# Patient Record
Sex: FEMALE | Race: WHITE | NOT HISPANIC OR LATINO | Employment: FULL TIME | URBAN - NONMETROPOLITAN AREA
[De-identification: names, ages, dates, MRNs, and addresses within clinical notes are randomized per-mention and may not be internally consistent; named-entity substitution may affect disease eponyms.]

---

## 2024-08-01 ENCOUNTER — OFFICE VISIT (OUTPATIENT)
Dept: URGENT CARE | Facility: CLINIC | Age: 44
End: 2024-08-01
Payer: COMMERCIAL

## 2024-08-01 VITALS
RESPIRATION RATE: 20 BRPM | HEART RATE: 94 BPM | OXYGEN SATURATION: 98 % | DIASTOLIC BLOOD PRESSURE: 92 MMHG | TEMPERATURE: 98.5 F | SYSTOLIC BLOOD PRESSURE: 142 MMHG

## 2024-08-01 DIAGNOSIS — J01.11 ACUTE RECURRENT FRONTAL SINUSITIS: Primary | ICD-10-CM

## 2024-08-01 PROCEDURE — 99203 OFFICE O/P NEW LOW 30 MIN: CPT | Performed by: STUDENT IN AN ORGANIZED HEALTH CARE EDUCATION/TRAINING PROGRAM

## 2024-08-01 RX ORDER — OMEPRAZOLE 40 MG/1
CAPSULE, DELAYED RELEASE ORAL
COMMUNITY
Start: 2024-07-29

## 2024-08-01 RX ORDER — FLUTICASONE PROPIONATE 50 MCG
1 SPRAY, SUSPENSION (ML) NASAL DAILY
COMMUNITY

## 2024-08-01 RX ORDER — AMOXICILLIN AND CLAVULANATE POTASSIUM 875; 125 MG/1; MG/1
1 TABLET, FILM COATED ORAL EVERY 12 HOURS SCHEDULED
Qty: 14 TABLET | Refills: 0 | Status: SHIPPED | OUTPATIENT
Start: 2024-08-01 | End: 2024-08-08

## 2024-08-02 NOTE — PROGRESS NOTES
[facility]        NAME: Lucia Lake is a 43 y.o. female  : 1980    MRN: [id number]  DATE: 2024  TIME: 9:21 PM    Assessment and Plan   Acute recurrent frontal sinusitis [J01.11]  1. Acute recurrent frontal sinusitis  amoxicillin-clavulanate (AUGMENTIN) 875-125 mg per tablet            Patient Instructions   There are no Patient Instructions on file for this visit.      Follow up with PCP in 3-5 days.  Proceed to  ER if symptoms worsen.    Chief Complaint     Chief Complaint   Patient presents with    Dizziness    Fatigue     Started yesterday         History of Present Illness       The patient is a 43-year-old female with past medical history significant for chronic nasal congestion and sinus pressure with recurrent episodes of dizziness who presents the clinic for headache and dizziness that occurred over the last 24 hours.  She states that she has mentioned that recurrent episodes of dizziness and congestion to her primary care doctor.  She states that the primary care doctor felt that her symptoms were related to her allergies and suggested continuing Flonase nasal spray.  She states that she has been using the Flonase nasal spray for several months which does not seem to help with her symptoms.  She does feel like she has a history of migraine headaches.  She states that she did take a medication for headache yesterday which did help with her symptoms but she continues to feel like she gets episodic dizziness when she moves her head.  She denies associated vertigo, blurry vision, slurred speech, numbness of her face, numbness of her extremities or generalized weakness.  She has never been referred to ENT.  She states that she also feels like she has chronic postnasal drip and is concerned that she may have sinusitis.  She has not been treated with antibiotics.        Review of Systems   Review of Systems   Constitutional:  Positive for diaphoresis.   HENT:  Positive for  congestion, sinus pressure and sinus pain. Negative for ear pain, facial swelling, hearing loss, mouth sores, nosebleeds, sneezing, sore throat, tinnitus, trouble swallowing and voice change.    Respiratory:  Positive for cough. Negative for apnea, chest tightness, shortness of breath, wheezing and stridor.    Neurological:  Positive for dizziness and headaches.         Current Medications       Current Outpatient Medications:     amoxicillin-clavulanate (AUGMENTIN) 875-125 mg per tablet, Take 1 tablet by mouth every 12 (twelve) hours for 7 days, Disp: 14 tablet, Rfl: 0    fluticasone (FLONASE) 50 mcg/act nasal spray, 1 spray into each nostril daily, Disp: , Rfl:     omeprazole (PriLOSEC) 40 MG capsule, , Disp: , Rfl:     Current Allergies     Allergies as of 2024    (No Known Allergies)            The following portions of the patient's history were reviewed and updated as appropriate: allergies, current medications, past family history, past medical history, past social history, past surgical history and problem list.     Past Medical History:   Diagnosis Date    GERD (gastroesophageal reflux disease)        Past Surgical History:   Procedure Laterality Date     SECTION         No family history on file.      Medications have been verified.        Objective   /92   Pulse 94   Temp 98.5 °F (36.9 °C)   Resp 20   SpO2 98%        Physical Exam     Physical Exam  Constitutional:       Appearance: She is well-developed. She is not diaphoretic.   HENT:      Head: Normocephalic.      Right Ear: Tympanic membrane is injected. Tympanic membrane is not erythematous or retracted.      Left Ear: Tympanic membrane is injected. Tympanic membrane is not retracted.      Nose: Congestion and rhinorrhea present.      Right Turbinates: Enlarged.      Right Sinus: Frontal sinus tenderness present.      Left Sinus: Frontal sinus tenderness present.   Eyes:      General:         Right eye: No discharge.          Left eye: No discharge.      Pupils: Pupils are equal, round, and reactive to light.   Neck:      Thyroid: No thyromegaly.   Cardiovascular:      Rate and Rhythm: Normal rate.      Heart sounds: No murmur heard.  Pulmonary:      Effort: Pulmonary effort is normal. No respiratory distress.      Breath sounds: No wheezing or rales.   Chest:      Chest wall: No tenderness.   Abdominal:      General: There is no distension.      Palpations: Abdomen is soft.      Tenderness: There is no abdominal tenderness. There is no guarding or rebound.   Musculoskeletal:         General: Normal range of motion.      Cervical back: Normal range of motion.   Lymphadenopathy:      Cervical: No cervical adenopathy.   Skin:     General: Skin is warm.   Neurological:      Mental Status: She is alert and oriented to person, place, and time.               -I will treat with Augmentin for suspected sinusitis.  I did suggest that she follow-up with the ENT if her dizziness does not resolve.

## 2025-02-14 ENCOUNTER — OFFICE VISIT (OUTPATIENT)
Dept: URGENT CARE | Facility: CLINIC | Age: 45
End: 2025-02-14
Payer: COMMERCIAL

## 2025-02-14 VITALS
RESPIRATION RATE: 14 BRPM | SYSTOLIC BLOOD PRESSURE: 124 MMHG | BODY MASS INDEX: 41.27 KG/M2 | HEART RATE: 103 BPM | DIASTOLIC BLOOD PRESSURE: 82 MMHG | TEMPERATURE: 97.5 F | HEIGHT: 66 IN | OXYGEN SATURATION: 97 % | WEIGHT: 256.8 LBS

## 2025-02-14 DIAGNOSIS — R68.89 FLU-LIKE SYMPTOMS: Primary | ICD-10-CM

## 2025-02-14 PROBLEM — Z87.891 FORMER SMOKER: Status: ACTIVE | Noted: 2021-04-09

## 2025-02-14 PROBLEM — K21.9 GASTROESOPHAGEAL REFLUX DISEASE: Status: ACTIVE | Noted: 2022-05-27

## 2025-02-14 PROBLEM — E66.01 MORBID OBESITY DUE TO EXCESS CALORIES (HCC): Chronic | Status: ACTIVE | Noted: 2022-07-11

## 2025-02-14 PROBLEM — J30.9 ALLERGIC RHINITIS: Status: ACTIVE | Noted: 2022-05-27

## 2025-02-14 PROCEDURE — 99213 OFFICE O/P EST LOW 20 MIN: CPT

## 2025-02-14 PROCEDURE — 87636 SARSCOV2 & INF A&B AMP PRB: CPT

## 2025-02-14 RX ORDER — BENZONATATE 200 MG/1
200 CAPSULE ORAL 3 TIMES DAILY PRN
Qty: 20 CAPSULE | Refills: 0 | Status: SHIPPED | OUTPATIENT
Start: 2025-02-14

## 2025-02-14 NOTE — LETTER
February 14, 2025     Patient: Lucia Lake  YOB: 1980  Date of Visit: 2/14/2025      To Whom it May Concern:    Lucia Lake is under my professional care. Lucia was seen in my office on 2/14/2025. Lucia may return to work on 2/17/2025 .    If you have any questions or concerns, please don't hesitate to call.         Sincerely,          DEE Joe        CC: No Recipients

## 2025-02-14 NOTE — PROGRESS NOTES
[facility]        NAME: Lucia Lake is a 44 y.o. female  : 1980    MRN: [id number]  DATE: 2025  TIME: 9:28 AM    Assessment and Plan   Flu-like symptoms [R68.89]  1. Flu-like symptoms  Covid/Flu- Office Collect Normal    benzonatate (TESSALON) 200 MG capsule    Covid/Flu- Office Collect Normal        Pt wishes to be COVID and flu tested when offered. Will call patient with any positive results. Advised on otc treatments, home remedies and the use of the tessalon up to 3 times per day as needed for cough suppression. Advised patient to take a decongestant medication to assist with her upper respiratory congestion. Advised patient on length of symptoms, what to expect at home and when to seek care for a recheck with pcp. If any breathing issues arise or new/concerning symptoms she was advised to seek care with the ER.    Patient Instructions       Follow up with PCP in 3-5 days.  Proceed to  ER if symptoms worsen.    If tests are performed, our office will contact you with results only if changes need to made to the care plan discussed with you at the visit. You can review your full results on [facility].    Chief Complaint     Chief Complaint   Patient presents with    Sore Throat     Started Wednesday. OTC Aleve and nasal decongestant taken. Yellow/greenish sputum w/coughing.     LUE pain comes and goes. Dull aching pain. Currently has now.     Cough    Nasal Congestion    Generalized Body Aches    LUE Pain         History of Present Illness       44-year-old female with past medical history significant for allergic rhinitis, former smoker, GERD, morbid obesity presents to this clinic with complaint of sore throat which started on Wednesday, cough, nasal congestion, generalized bodyaches, and left upper extremity pain.  Patient reports she is taking over-the-counter Aleve and nasal decongestant.  She is experiencing yellow-greenish sputum when coughing.  In regards to the  "left upper extremity pain, she reports it is a dull aching pain which comes and goes and she currently has this pain.  She denies any injury or trauma.    Sore Throat   Associated symptoms include congestion and coughing.   Cough  Associated symptoms include myalgias and a sore throat.   Generalized Body Aches  Associated symptoms include congestion, a sore throat and coughing.       Review of Systems   Review of Systems   HENT:  Positive for congestion and sore throat.    Respiratory:  Positive for cough.    Musculoskeletal:  Positive for arthralgias (Left upper arm pain) and myalgias.         Current Medications       Current Outpatient Medications:     benzonatate (TESSALON) 200 MG capsule, Take 1 capsule (200 mg total) by mouth 3 (three) times a day as needed for cough, Disp: 20 capsule, Rfl: 0    fluticasone (FLONASE) 50 mcg/act nasal spray, 1 spray into each nostril daily, Disp: , Rfl:     omeprazole (PriLOSEC) 40 MG capsule, , Disp: , Rfl:     Current Allergies     Allergies as of 2025    (No Known Allergies)            The following portions of the patient's history were reviewed and updated as appropriate: allergies, current medications, past family history, past medical history, past social history, past surgical history and problem list.     Past Medical History:   Diagnosis Date    GERD (gastroesophageal reflux disease)        Past Surgical History:   Procedure Laterality Date     SECTION         Family History   Problem Relation Age of Onset    Hypertension Mother     Hypertension Father          Medications have been verified.        Objective   /82   Pulse 103   Temp 97.5 °F (36.4 °C)   Resp 14   Ht 5' 6\" (1.676 m)   Wt 116 kg (256 lb 12.8 oz)   LMP 2025 (Approximate)   SpO2 97%   BMI 41.45 kg/m²        Physical Exam     Physical Exam  Vitals and nursing note reviewed.   Constitutional:       Appearance: Normal appearance. She is obese.   HENT:      Head: " Normocephalic and atraumatic.      Right Ear: Ear canal and external ear normal. Tympanic membrane is injected.      Left Ear: Ear canal and external ear normal. Tympanic membrane is injected.      Ears:      Comments: Mild to moderate fluid behind bilateral TM     Nose: Congestion present.      Right Turbinates: Swollen.      Left Turbinates: Swollen.      Right Sinus: No maxillary sinus tenderness or frontal sinus tenderness.      Left Sinus: No maxillary sinus tenderness or frontal sinus tenderness.      Mouth/Throat:      Lips: Pink.      Mouth: Mucous membranes are moist.      Pharynx: Uvula midline. Pharyngeal swelling, posterior oropharyngeal erythema and uvula swelling present. No postnasal drip.      Tonsils: No tonsillar exudate or tonsillar abscesses.   Eyes:      General: Lids are normal. Vision grossly intact. Gaze aligned appropriately.      Extraocular Movements: Extraocular movements intact.      Conjunctiva/sclera:      Right eye: Right conjunctiva is injected.      Left eye: Left conjunctiva is injected.      Pupils: Pupils are equal, round, and reactive to light.   Neck:      Trachea: Trachea normal.      Comments: Raspy voice  Cardiovascular:      Rate and Rhythm: Regular rhythm. Tachycardia present.      Pulses: Normal pulses.      Heart sounds: Normal heart sounds.   Pulmonary:      Effort: Pulmonary effort is normal.      Breath sounds: Normal breath sounds.   Abdominal:      General: Bowel sounds are normal.      Palpations: Abdomen is soft.   Musculoskeletal:         General: Normal range of motion.      Cervical back: Full passive range of motion without pain, normal range of motion and neck supple.   Lymphadenopathy:      Cervical: Cervical adenopathy present.   Skin:     General: Skin is warm and dry.      Capillary Refill: Capillary refill takes less than 2 seconds.   Neurological:      General: No focal deficit present.      Mental Status: She is alert and oriented to person, place, and  time.

## 2025-02-14 NOTE — PATIENT INSTRUCTIONS
You had testing performed here today which will be sent out for results. You can see your results in your MyChart if you have one, and you will also be contacted by the office if any treatments are needed or anything need to change with your care. We will call you with any positive results.    Take a DECONGESTANT of choice to clear your congestion. (SUDAFED)    Most upper respiratory infections are viral and resolve on their own within 10-14 days. Antibiotics are not indicated for the viral infection, and are only prescribed if there is evidence for a bacterial infection. Sometimes an upper respiratory infection may lead to secondary bacterial infection, such as bacterial sinusitis, in which case antibiotics would be indicated at that time. If your symptoms continue beyond 10-14 days or if you experience ongoing fevers, productive cough with green, brown, bloody phlegm production, you may have developed a bacterial infection. For the uncomplicated viral upper respiratory infection conservative management includes:     Fever and pain control:  Ibuprofen (Motrin) 600mg every 6 hours for fever, headaches, body aches   Ibuprofen is an NSAID. Please stop medication if you experience stomach/abdominal pain and report to your primary care provider.   Ask your primary care provider before you take NSAIDs if you are on any blood thinners, or if you have a history of heart disease, kidney disease, gastric bypass surgery, GI bleed, or poorly controlled high blood pressure.   May use acetaminophen (Tylenol) as directed on the bottle between doses of ibuprofen. Do not exceed 4,000mg of Tylenol a day.   Cough & Congestion:  Guaifenesin (Mucinex) as directed on the bottle for congestion and mucous-y cough.   Dextromethorphan (Delsym, Robitussin) for dry cough and cough suppression   Pseudoephedrine (Sudafed) for congestion and sinus pressure   Sudafed may cause increased heart rate, irregular heart rate, and an increase in blood  pressure. Please do not take Sudafed if you have a history of heart disease or high blood pressure.   Sudafed should not be taken if you are on anti-depressants such as those belonging to the class MAOIs or tricyclics.  Coricidin HBP (chlorpheniramine maleate) can be used as a decongestant in place of other options for those unable to take Sudafed.   Combination cough and cold such as Dimetapp and Mucinex DM also available  Sudafed PE Head Congestion +Flu Severe contains a combination of Sudafed, Tylenol, Mucinex, and Delsym  If prescribed, take Tessalon Pearles or Bromfed/Phenergan DM as directed  Avoid taking prescription cough/congestion medication and OTC options at the same time  Sore Throat:  Cepacol lozenges  Chloraseptic spray  Throat Coat tea  Warm salt water gargles   Vitamin/Minerals:  Vitamin D3 2,000 IU daily  Vitamin C 1000mg twice a day  Some studies suggest that Zinc 12.5-15mg every 2 hours while awake for 5 days may shorten symptom duration by 1-2 days  Other:   Plenty of fluids and rest  Cool mist humidifiers  Nasal sinus rinses such as NettiPot, Neimed, or Navage can be used to help flush out sinuses  Please only use distilled/sterile water that can be purchased at your local pharmacy  Nasal spray options:  Nasal steroid sprays such as Flonase, Nasonex, Nasacort may help with sinus congestion, itchy/watery eyes, clogged ears  These options must be used consistently for at least 2 weeks for full effect  Afrin nasal spray for quick acting congestion relief  Saline nasal spray for dry nose, irritation of the nasal passages  Follow up with PCP in 3-5 days  Proceed to the ED if symptoms worsen

## 2025-02-15 ENCOUNTER — RESULTS FOLLOW-UP (OUTPATIENT)
Dept: URGENT CARE | Facility: CLINIC | Age: 45
End: 2025-02-15

## 2025-02-15 LAB
FLUAV RNA RESP QL NAA+PROBE: NEGATIVE
FLUBV RNA RESP QL NAA+PROBE: NEGATIVE
SARS-COV-2 RNA RESP QL NAA+PROBE: POSITIVE